# Patient Record
Sex: FEMALE | Race: AMERICAN INDIAN OR ALASKA NATIVE
[De-identification: names, ages, dates, MRNs, and addresses within clinical notes are randomized per-mention and may not be internally consistent; named-entity substitution may affect disease eponyms.]

---

## 2019-01-08 ENCOUNTER — HOSPITAL ENCOUNTER (EMERGENCY)
Dept: HOSPITAL 5 - ED | Age: 34
Discharge: HOME | End: 2019-01-08
Payer: SELF-PAY

## 2019-01-08 VITALS — SYSTOLIC BLOOD PRESSURE: 108 MMHG | DIASTOLIC BLOOD PRESSURE: 68 MMHG

## 2019-01-08 DIAGNOSIS — F17.200: ICD-10-CM

## 2019-01-08 DIAGNOSIS — R11.2: Primary | ICD-10-CM

## 2019-01-08 DIAGNOSIS — F41.9: ICD-10-CM

## 2019-01-08 LAB
ALBUMIN SERPL-MCNC: 4.1 G/DL (ref 3.9–5)
ALT SERPL-CCNC: 14 UNITS/L (ref 7–56)
BASOPHILS # (AUTO): 0 K/MM3 (ref 0–0.1)
BASOPHILS NFR BLD AUTO: 0.3 % (ref 0–1.8)
BILIRUB UR QL STRIP: (no result)
BLOOD UR QL VISUAL: (no result)
BUN SERPL-MCNC: 12 MG/DL (ref 7–17)
BUN/CREAT SERPL: 24 %
CALCIUM SERPL-MCNC: 8.6 MG/DL (ref 8.4–10.2)
EOSINOPHIL # BLD AUTO: 0.1 K/MM3 (ref 0–0.4)
EOSINOPHIL NFR BLD AUTO: 1.9 % (ref 0–4.3)
HCT VFR BLD CALC: 37 % (ref 30.3–42.9)
HEMOLYSIS INDEX: 42
HGB BLD-MCNC: 12.3 GM/DL (ref 10.1–14.3)
LYMPHOCYTES # BLD AUTO: 0.8 K/MM3 (ref 1.2–5.4)
LYMPHOCYTES NFR BLD AUTO: 12.2 % (ref 13.4–35)
MCHC RBC AUTO-ENTMCNC: 33 % (ref 30–34)
MCV RBC AUTO: 97 FL (ref 79–97)
MONOCYTES # (AUTO): 0.3 K/MM3 (ref 0–0.8)
MONOCYTES % (AUTO): 5.2 % (ref 0–7.3)
MUCOUS THREADS #/AREA URNS HPF: (no result) /HPF
PH UR STRIP: 8 [PH] (ref 5–7)
PLATELET # BLD: 249 K/MM3 (ref 140–440)
RBC # BLD AUTO: 3.8 M/MM3 (ref 3.65–5.03)
RBC #/AREA URNS HPF: 4 /HPF (ref 0–6)
UROBILINOGEN UR-MCNC: < 2 MG/DL (ref ?–2)
WBC #/AREA URNS HPF: < 1 /HPF (ref 0–6)

## 2019-01-08 PROCEDURE — 85025 COMPLETE CBC W/AUTO DIFF WBC: CPT

## 2019-01-08 PROCEDURE — 36415 COLL VENOUS BLD VENIPUNCTURE: CPT

## 2019-01-08 PROCEDURE — 96374 THER/PROPH/DIAG INJ IV PUSH: CPT

## 2019-01-08 PROCEDURE — 96361 HYDRATE IV INFUSION ADD-ON: CPT

## 2019-01-08 PROCEDURE — 81025 URINE PREGNANCY TEST: CPT

## 2019-01-08 PROCEDURE — 83690 ASSAY OF LIPASE: CPT

## 2019-01-08 PROCEDURE — 96375 TX/PRO/DX INJ NEW DRUG ADDON: CPT

## 2019-01-08 PROCEDURE — 99284 EMERGENCY DEPT VISIT MOD MDM: CPT

## 2019-01-08 PROCEDURE — 81001 URINALYSIS AUTO W/SCOPE: CPT

## 2019-01-08 PROCEDURE — 80053 COMPREHEN METABOLIC PANEL: CPT

## 2019-01-08 PROCEDURE — 74022 RADEX COMPL AQT ABD SERIES: CPT

## 2019-01-08 NOTE — EMERGENCY DEPARTMENT REPORT
ED General Adult HPI





- General


Chief complaint: Nausea/Vomiting/Diarrhea


Stated complaint: VOMIT


Time Seen by Provider: 01/08/19 10:09


Source: patient


Mode of arrival: Ambulatory


Limitations: No Limitations





- History of Present Illness


Initial comments: 





Patient presents to emergency department with a chief complaint of nausea and 

vomiting with abdominal cramping.  Patient states the symptoms started this 

morning.  Patient denies any sick contacts or diarrhea.  Patient has chest pain,

shortness of breath, or headache.  Patient works at [employer] as a manager and 

has multiple contacts will potentially sick people.


-: Sudden


Location: abdomen


Radiation: non-radiation


Severity scale (0 -10): 2


Quality: burning


Consistency: constant


Improves with: none


Worsens with: none


Associated Symptoms: nausea/vomiting, other (denies diarrhea)


Treatments Prior to Arrival: none





- Related Data


                                  Previous Rx's











 Medication  Instructions  Recorded  Last Taken  Type


 


Ibuprofen [Motrin] 600 mg PO Q8H PRN #12 tablet 12/08/18 Unknown Rx


 


predniSONE [Deltasone] 50 mg PO QDAY 5 Days #5 tab 12/08/18 Unknown Rx


 


Ondansetron [Zofran Odt] 4 mg PO Q8HR PRN #12 tab.rapdis 01/08/19 Unknown Rx


 


Promethazine [Phenergan TAB] 25 mg PO Q6HR PRN #20 tab 01/08/19 Unknown Rx


 


traMADol [Ultram] 50 mg PO Q6HR PRN #20 tablet 01/08/19 Unknown Rx











                                    Allergies











Allergy/AdvReac Type Severity Reaction Status Date / Time


 


No Known Allergies Allergy   Verified 12/08/18 18:05














ED Review of Systems


ROS: 


Stated complaint: VOMIT


Other details as noted in HPI





Comment: All other systems reviewed and negative


Constitutional: denies: chills, fever


Eyes: denies: eye pain, eye discharge, vision change


ENT: denies: ear pain, throat pain


Respiratory: denies: cough, shortness of breath, wheezing


Cardiovascular: denies: chest pain, palpitations


Endocrine: no symptoms reported


Gastrointestinal: abdominal pain (cramping), nausea, vomiting.  denies: diarrhea


Genitourinary: denies: urgency, dysuria, discharge


Musculoskeletal: denies: back pain, joint swelling, arthralgia


Skin: denies: rash, lesions


Neurological: denies: headache, weakness, paresthesias


Psychiatric: denies: anxiety, depression


Hematological/Lymphatic: denies: easy bleeding, easy bruising





ED Past Medical Hx





- Past Medical History


Previous Medical History?: Yes


Hx Psychiatric Treatment: Yes (anxiety)


Additional medical history: pre cardiac disease





- Surgical History


Past Surgical History?: No





- Social History


Smoking Status: Current Every Day Smoker


Substance Use Type: None





- Medications


Home Medications: 


                                Home Medications











 Medication  Instructions  Recorded  Confirmed  Last Taken  Type


 


Ibuprofen [Motrin] 600 mg PO Q8H PRN #12 tablet 12/08/18  Unknown Rx


 


predniSONE [Deltasone] 50 mg PO QDAY 5 Days #5 tab 12/08/18  Unknown Rx


 


Ondansetron [Zofran Odt] 4 mg PO Q8HR PRN #12 tab.rapdis 01/08/19  Unknown Rx


 


Promethazine [Phenergan TAB] 25 mg PO Q6HR PRN #20 tab 01/08/19  Unknown Rx


 


traMADol [Ultram] 50 mg PO Q6HR PRN #20 tablet 01/08/19  Unknown Rx














ED Physical Exam





- General


Limitations: No Limitations


General appearance: alert, in no apparent distress





- Head


Head exam: Present: atraumatic, normocephalic





- Eye


Eye exam: Present: normal appearance, PERRL, EOMI





- ENT


ENT exam: Present: mucous membranes dry





- Neck


Neck exam: Present: normal inspection





- Respiratory


Respiratory exam: Present: normal lung sounds bilaterally.  Absent: respiratory 

distress, wheezes, rales





- Cardiovascular


Cardiovascular Exam: Present: normal rhythm, tachycardia.  Absent: systolic 

murmur, diastolic murmur, rubs, gallop





- GI/Abdominal


GI/Abdominal exam: Present: soft, normal bowel sounds.  Absent: distended, 

tenderness





- Extremities Exam


Extremities exam: Present: normal inspection





- Back Exam


Back exam: Present: normal inspection





- Neurological Exam


Neurological exam: Present: alert, oriented X3, CN II-XII intact.  Absent: motor

 sensory deficit





- Psychiatric


Psychiatric exam: Present: normal affect, normal mood





- Skin


Skin exam: Present: warm, dry, intact, normal color.  Absent: rash





ED Course


                                   Vital Signs











  01/08/19





  09:11


 


Temperature 98.6 F


 


Pulse Rate 77


 


Respiratory 16





Rate 


 


Blood Pressure 108/68


 


O2 Sat by Pulse 99





Oximetry 














ED Medical Decision Making





- Lab Data


Result diagrams: 


                                 01/08/19 10:39





                                 01/08/19 10:39








                                   Lab Results











  01/08/19 01/08/19 01/08/19 Range/Units





  10:39 10:39 Unknown 


 


WBC  6.4    (4.5-11.0)  K/mm3


 


RBC  3.80    (3.65-5.03)  M/mm3


 


Hgb  12.3    (10.1-14.3)  gm/dl


 


Hct  37.0    (30.3-42.9)  %


 


MCV  97    (79-97)  fl


 


MCH  32    (28-32)  pg


 


MCHC  33    (30-34)  %


 


RDW  13.3    (13.2-15.2)  %


 


Plt Count  249    (140-440)  K/mm3


 


Lymph % (Auto)  12.2 L    (13.4-35.0)  %


 


Mono % (Auto)  5.2    (0.0-7.3)  %


 


Eos % (Auto)  1.9    (0.0-4.3)  %


 


Baso % (Auto)  0.3    (0.0-1.8)  %


 


Lymph #  0.8 L    (1.2-5.4)  K/mm3


 


Mono #  0.3    (0.0-0.8)  K/mm3


 


Eos #  0.1    (0.0-0.4)  K/mm3


 


Baso #  0.0    (0.0-0.1)  K/mm3


 


Seg Neutrophils %  80.4 H    (40.0-70.0)  %


 


Seg Neutrophils #  5.2    (1.8-7.7)  K/mm3


 


Sodium   138   (137-145)  mmol/L


 


Potassium   4.0   (3.6-5.0)  mmol/L


 


Chloride   102.0   ()  mmol/L


 


Carbon Dioxide   24   (22-30)  mmol/L


 


Anion Gap   16   mmol/L


 


BUN   12   (7-17)  mg/dL


 


Creatinine   0.5 L   (0.7-1.2)  mg/dL


 


Estimated GFR   > 60   ml/min


 


BUN/Creatinine Ratio   24   %


 


Glucose   93   ()  mg/dL


 


Calcium   8.6   (8.4-10.2)  mg/dL


 


Total Bilirubin   0.30   (0.1-1.2)  mg/dL


 


AST   16   (5-40)  units/L


 


ALT   14   (7-56)  units/L


 


Alkaline Phosphatase   58   ()  units/L


 


Total Protein   6.1 L   (6.3-8.2)  g/dL


 


Albumin   4.1   (3.9-5)  g/dL


 


Albumin/Globulin Ratio   2.1   %


 


Lipase   31   (13-60)  units/L


 


Urine Color    Yellow  (Yellow)  


 


Urine Turbidity    Slightly-cloudy  (Clear)  


 


Urine pH    8.0 H  (5.0-7.0)  


 


Ur Specific Gravity    1.020  (1.003-1.030)  


 


Urine Protein    30 mg/dl  (Negative)  mg/dL


 


Urine Glucose (UA)    Neg  (Negative)  mg/dL


 


Urine Ketones    Neg  (Negative)  mg/dL


 


Urine Blood    Neg  (Negative)  


 


Urine Nitrite    Neg  (Negative)  


 


Urine Bilirubin    Neg  (Negative)  


 


Urine Urobilinogen    < 2.0  (<2.0)  mg/dL


 


Ur Leukocyte Esterase    Neg  (Negative)  


 


Urine WBC (Auto)    < 1.0  (0.0-6.0)  /HPF


 


Urine RBC (Auto)    4.0  (0.0-6.0)  /HPF


 


U Epithel Cells (Auto)    5.0  (0-13.0)  /HPF


 


Urine Mucus    3+  /HPF


 


Urine HCG, Qual    Negative  (Negative)  














- Medical Decision Making





Discussed results with the patient


Improved with meds and IV fluids


Critical care attestation.: 


If time is entered above; I have spent that time in minutes in the direct care 

of this critically ill patient, excluding procedure time.








ED Disposition


Clinical Impression: 


 Nausea & vomiting





Disposition: DC-01 TO HOME OR SELFCARE


Is pt being admited?: No


Does the pt Need Aspirin: No


Condition: Stable


Instructions:  Acute Nausea and Vomiting (ED)


Additional Instructions: 


Return if worse


Prescriptions: 


Ondansetron [Zofran Odt] 4 mg PO Q8HR PRN #12 tab.rapdis


 PRN Reason: Nausea


Promethazine [Phenergan TAB] 25 mg PO Q6HR PRN #20 tab


 PRN Reason: Nausea


traMADol [Ultram] 50 mg PO Q6HR PRN #20 tablet


 PRN Reason: Pain


Referrals: 


[facility] [Primary Care Provider] - 3-5 Days


[facility] [Outside] - 3-5 Days


Forms:  Work/School Release Form(ED)


Time of Disposition: 12:24

## 2019-01-08 NOTE — XRAY REPORT
ABDOMINAL SERIES:



History: Pain.



Erect chest film shows no acute or significant changes involving the 

heart or lung fields.  There is no evidence of free air beneath the 

diaphragms.  The gas pattern within the abdomen is unremarkable. There 

is no evidence of bowel dilatation, significant air-fluid levels, or 

masses.  Organ shadows are unremarkable.



IMPRESSION:

Abdominal series within normal limits.

## 2019-07-30 ENCOUNTER — HOSPITAL ENCOUNTER (EMERGENCY)
Dept: HOSPITAL 5 - ED | Age: 34
Discharge: HOME | End: 2019-07-30
Payer: SELF-PAY

## 2019-07-30 VITALS — DIASTOLIC BLOOD PRESSURE: 78 MMHG | SYSTOLIC BLOOD PRESSURE: 117 MMHG

## 2019-07-30 DIAGNOSIS — F17.200: ICD-10-CM

## 2019-07-30 DIAGNOSIS — B96.89: ICD-10-CM

## 2019-07-30 DIAGNOSIS — Z79.899: ICD-10-CM

## 2019-07-30 DIAGNOSIS — N76.0: Primary | ICD-10-CM

## 2019-07-30 DIAGNOSIS — F41.9: ICD-10-CM

## 2019-07-30 DIAGNOSIS — F12.10: ICD-10-CM

## 2019-07-30 PROCEDURE — 87591 N.GONORRHOEAE DNA AMP PROB: CPT

## 2019-07-30 PROCEDURE — 87210 SMEAR WET MOUNT SALINE/INK: CPT

## 2019-07-30 NOTE — EMERGENCY DEPARTMENT REPORT
ED Female  HPI





- General


Chief complaint: Urogenital-Female


Stated complaint: DISCHARGE


Time Seen by Provider: 19 11:23


Source: patient


Mode of arrival: Ambulatory


Limitations: No Limitations





- History of Present Illness


Initial comments: 





This is a 33-year-old  female who presents to emergency room 

with vaginal discharge for 2 days.  Patient states she recently completed some 

antibiotics for her to think she may possibly have a yeast infection.  LMP 

07/15/19, A0.  She denies pelvic pain, urinary frequency, urgency, dysuria, 

or vaginal bleeding.


MD Complaint: vaginal discharge


Onset/Timin


-: days(s)


Location: labia


Radiation: non-radiating


Severity: mild


Severity scale (0 -10): 3


Improves with: none


Worsens with: urination


Are you Pregnant Now?: No


Last Menstrual Period: 07/15/19


EDC: 20


Associated Symptoms: denies other symptoms





- Related Data


Sexually active: Yes (Women only)


: 1


Para: 1


A: 0


                                  Previous Rx's











 Medication  Instructions  Recorded  Last Taken  Type


 


Ibuprofen [Motrin] 600 mg PO Q8H PRN #12 tablet 18 Unknown Rx


 


predniSONE [Deltasone] 50 mg PO QDAY 5 Days #5 tab 18 Unknown Rx


 


Ondansetron [Zofran Odt] 4 mg PO Q8HR PRN #12 tab.rapdis 19 Unknown Rx


 


Promethazine [Phenergan TAB] 25 mg PO Q6HR PRN #20 tab 19 Unknown Rx


 


traMADol [Ultram] 50 mg PO Q6HR PRN #20 tablet 19 Unknown Rx


 


Fluconazole [Diflucan TAB] 150 mg PO ONCE #1 tablet 19 Unknown Rx


 


metroNIDAZOLE [Flagyl TAB] 500 mg PO Q12HR #14 tab 19 Unknown Rx











                                    Allergies











Allergy/AdvReac Type Severity Reaction Status Date / Time


 


No Known Allergies Allergy   Verified 18 18:05














ED Review of Systems


ROS: 


Stated complaint: DISCHARGE


Other details as noted in HPI





Constitutional: denies: chills, fever


Respiratory: denies: cough, shortness of breath, wheezing


Cardiovascular: denies: chest pain, palpitations


Gastrointestinal: denies: abdominal pain, nausea, diarrhea


Genitourinary: discharge.  denies: urgency, dysuria


Musculoskeletal: denies: back pain, joint swelling, arthralgia


Skin: denies: rash, lesions


Neurological: denies: headache, weakness, paresthesias


Psychiatric: denies: anxiety, depression





ED Past Medical Hx





- Past Medical History


Previous Medical History?: Yes


Hx Psychiatric Treatment: Yes (anxiety)


Additional medical history: pre cardiac disease





- Surgical History


Past Surgical History?: No





- Social History


Smoking Status: Heavy Tobacco Smoker


Substance Use Type: Marijuana





- Medications


Home Medications: 


                                Home Medications











 Medication  Instructions  Recorded  Confirmed  Last Taken  Type


 


Ibuprofen [Motrin] 600 mg PO Q8H PRN #12 tablet 18  Unknown Rx


 


predniSONE [Deltasone] 50 mg PO QDAY 5 Days #5 tab 18  Unknown Rx


 


Ondansetron [Zofran Odt] 4 mg PO Q8HR PRN #12 tab.rapdis 19  Unknown Rx


 


Promethazine [Phenergan TAB] 25 mg PO Q6HR PRN #20 tab 19  Unknown Rx


 


traMADol [Ultram] 50 mg PO Q6HR PRN #20 tablet 19  Unknown Rx


 


Fluconazole [Diflucan TAB] 150 mg PO ONCE #1 tablet 19  Unknown Rx


 


metroNIDAZOLE [Flagyl TAB] 500 mg PO Q12HR #14 tab 19  Unknown Rx














ED Physical Exam





- General


Limitations: No Limitations


General appearance: alert, in no apparent distress





- Respiratory


Respiratory exam: Present: normal lung sounds bilaterally.  Absent: respiratory 

distress





- Cardiovascular


Cardiovascular Exam: Present: regular rate, normal rhythm.  Absent: systolic 

murmur, diastolic murmur, rubs, gallop





- GI/Abdominal


GI/Abdominal exam: Present: soft, normal bowel sounds.  Absent: distended, 

tenderness, guarding, rebound, rigid, organomegaly





- 


External exam: Present: normal external exam


Speculum exam: Present: vaginal discharge (malodorous curdy white).  Absent: 

cervical discharge, vaginal bleeding, foreign body, tissue, laceration


Bi-manual exam: Present: normal bi-manual exam.  Absent: cervical motion 

tendernes, adnexal tenderness, adnexal mass, uterine enlargement





- Back Exam


Back exam: Absent: CVA tenderness (R), CVA tenderness (L)





- Neurological Exam


Neurological exam: Present: alert, oriented X3





- Psychiatric


Psychiatric exam: Present: normal affect, normal mood





- Skin


Skin exam: Present: warm, dry, intact, normal color.  Absent: rash





ED Course


                                   Vital Signs











  19





  10:53


 


Temperature 98.4 F


 


Pulse Rate 69


 


Respiratory 16





Rate 


 


Blood Pressure 117/78


 


O2 Sat by Pulse 99





Oximetry 














ED Medical Decision Making





- Medical Decision Making





This is a 33-year-old -American female who presents with vaginal 

discharge after completing antibiotics. Patient was examined by me.  Vitals are 

stable and in no acute distress.  A wet prep and gonorrhea and Chlamydia was 

obtained via pelvic exam.  Gonorrhea and chlamydia is pending.  Wet prep 

positive for clue cells and yeast, negative trichomoniasis.  Start metronidazole

and Diflucan for acute vaginitis.  Discharged home in stable condition.  

Discussed prevention options. F/U with PCP or Health Department.


Critical care attestation.: 


If time is entered above; I have spent that time in minutes in the direct care 

of this critically ill patient, excluding procedure time.








ED Disposition


Clinical Impression: 


 Vaginal yeast infection, Bacterial vaginitis, Vaginal discharge, Acute 

vaginitis





Disposition:  TO HOME OR SELFCARE


Is pt being admited?: No


Does the pt Need Aspirin: No


Condition: Stable


Instructions:  Bacterial Vaginosis (ED), Vaginitis (ED), Vulvovaginal 

Candidiasis (ED)


Additional Instructions: 


Please course of antibiotics as prescribed.  Avoid drinking alcohol while taking

antibiotics for 24 hours if completion.  Follow up with a gynecologist or the 

health department for full STD screen him. 


Prescriptions: 


Fluconazole [Diflucan TAB] 150 mg PO ONCE #1 tablet


metroNIDAZOLE [Flagyl TAB] 500 mg PO Q12HR #14 tab


Referrals: 


[facility] [Outside] - 3-5 Days


[facility] [Outside] - 3-5 Days


[facility] [Provider Group] - 3-5 Days


[facility] [Provider Group] - 3-5 Days


[facility] [Outside] - 3-5 Days


Forms:  STI Treatment and Prevention


Time of Disposition: 13:19

## 2019-09-12 ENCOUNTER — HOSPITAL ENCOUNTER (EMERGENCY)
Dept: HOSPITAL 5 - ED | Age: 34
Discharge: HOME | End: 2019-09-12
Payer: SELF-PAY

## 2019-09-12 VITALS — SYSTOLIC BLOOD PRESSURE: 112 MMHG | DIASTOLIC BLOOD PRESSURE: 71 MMHG

## 2019-09-12 DIAGNOSIS — Z79.899: ICD-10-CM

## 2019-09-12 DIAGNOSIS — H91.92: ICD-10-CM

## 2019-09-12 DIAGNOSIS — H92.02: Primary | ICD-10-CM

## 2019-09-12 DIAGNOSIS — F41.9: ICD-10-CM

## 2019-09-12 NOTE — EMERGENCY DEPARTMENT REPORT
ED ENT HPI





- General


Chief complaint: Earache


Stated complaint: LFT EAR CANT HEAR


Time Seen by Provider: 09/12/19 12:27


Source: patient


Mode of arrival: Ambulatory


Limitations: No Limitations





- History of Present Illness


Initial comments: 





34-year-old female presents to the hospital complains of a fullness to her left 

ear intermittent decreased hearing for several months.  Patient overslept and 

was late for work so decided to come to the ER to get it checked out.  She feels

like is full of wax.  She denies fever, drainage, or pain.





- Related Data


                                  Previous Rx's











 Medication  Instructions  Recorded  Last Taken  Type


 


Ibuprofen [Motrin] 600 mg PO Q8H PRN #12 tablet 12/08/18 Unknown Rx


 


predniSONE [Deltasone] 50 mg PO QDAY 5 Days #5 tab 12/08/18 Unknown Rx


 


Ondansetron [Zofran Odt] 4 mg PO Q8HR PRN #12 tab.rapdis 01/08/19 Unknown Rx


 


Promethazine [Phenergan TAB] 25 mg PO Q6HR PRN #20 tab 01/08/19 Unknown Rx


 


traMADol [Ultram] 50 mg PO Q6HR PRN #20 tablet 01/08/19 Unknown Rx


 


Fluconazole [Diflucan TAB] 150 mg PO ONCE #1 tablet 07/30/19 Unknown Rx


 


metroNIDAZOLE [Flagyl TAB] 500 mg PO Q12HR #14 tab 07/30/19 Unknown Rx











                                    Allergies











Allergy/AdvReac Type Severity Reaction Status Date / Time


 


No Known Allergies Allergy   Verified 12/08/18 18:05














ED Dental HPI





- General


Chief complaint: Earache


Stated complaint: LFT EAR CANT HEAR


Time Seen by Provider: 09/12/19 12:27


Source: patient


Mode of arrival: Ambulatory


Limitations: No Limitations





- Related Data


                                  Previous Rx's











 Medication  Instructions  Recorded  Last Taken  Type


 


Ibuprofen [Motrin] 600 mg PO Q8H PRN #12 tablet 12/08/18 Unknown Rx


 


predniSONE [Deltasone] 50 mg PO QDAY 5 Days #5 tab 12/08/18 Unknown Rx


 


Ondansetron [Zofran Odt] 4 mg PO Q8HR PRN #12 tab.rapdis 01/08/19 Unknown Rx


 


Promethazine [Phenergan TAB] 25 mg PO Q6HR PRN #20 tab 01/08/19 Unknown Rx


 


traMADol [Ultram] 50 mg PO Q6HR PRN #20 tablet 01/08/19 Unknown Rx


 


Fluconazole [Diflucan TAB] 150 mg PO ONCE #1 tablet 07/30/19 Unknown Rx


 


metroNIDAZOLE [Flagyl TAB] 500 mg PO Q12HR #14 tab 07/30/19 Unknown Rx











                                    Allergies











Allergy/AdvReac Type Severity Reaction Status Date / Time


 


No Known Allergies Allergy   Verified 12/08/18 18:05














ED Review of Systems


ROS: 


Stated complaint: LFT EAR CANT HEAR


Other details as noted in HPI





Comment: All other systems reviewed and negative





ED Past Medical Hx





- Past Medical History


Hx Psychiatric Treatment: Yes (anxiety)


Additional medical history: pre cardiac disease





- Social History


Smoking Status: Never Smoker


Substance Use Type: None





- Medications


Home Medications: 


                                Home Medications











 Medication  Instructions  Recorded  Confirmed  Last Taken  Type


 


Ibuprofen [Motrin] 600 mg PO Q8H PRN #12 tablet 12/08/18  Unknown Rx


 


predniSONE [Deltasone] 50 mg PO QDAY 5 Days #5 tab 12/08/18  Unknown Rx


 


Ondansetron [Zofran Odt] 4 mg PO Q8HR PRN #12 tab.rapdis 01/08/19  Unknown Rx


 


Promethazine [Phenergan TAB] 25 mg PO Q6HR PRN #20 tab 01/08/19  Unknown Rx


 


traMADol [Ultram] 50 mg PO Q6HR PRN #20 tablet 01/08/19  Unknown Rx


 


Fluconazole [Diflucan TAB] 150 mg PO ONCE #1 tablet 07/30/19  Unknown Rx


 


metroNIDAZOLE [Flagyl TAB] 500 mg PO Q12HR #14 tab 07/30/19  Unknown Rx














ED Physical Exam





- General


Limitations: No Limitations





- Other


Other exam information: 





Gen.: No acute distress


Head: Atraumatic


Eyes: Normal appearance


ENT: Moist mucous membranes, bilateral ear canals without erythema or cerumen 

impaction.  No tenderness with tract distraction.  Bilateral TMs normal with 

good light reflex, no redness, no perforation, and no signs of fluid.  Patient's

hearing and grossly intact.


Neck: Normal appearance, no posterior midline tenderness, no meningismus


Chest: Clear to auscultation bilaterally


Cardiovascular: Regular rate and rhythm


Abdomen: Normal appearance, soft, nontender, no rebound or guarding, normal 

bowel sounds


Back: Normal appearance, nontender


Extremity: Full range of motion, normal appearance


Neuro: Alert, clear speech, no focal motor or sensory deficit


Psychiatric: Appropriate


Skin: No rash





ED Course





                                   Vital Signs











  09/12/19





  11:53


 


Temperature 98.3 F


 


Pulse Rate 92 H


 


Respiratory 16





Rate 


 


Blood Pressure 113/76





[Left] 


 


O2 Sat by Pulse 99





Oximetry 














ED Medical Decision Making





- Medical Decision Making





Patient does not have any gross abnormality on examination.  Reports 

intermittent symptoms times months.  Hearing grossly intact.  Outpatient follow-

up with ENT advised





- Differential Diagnosis


perforated TM, fluid in the ear, otitis media, otitis externa


Critical Care Time: No


Critical care attestation.: 


If time is entered above; I have spent that time in minutes in the direct care 

of this critically ill patient, excluding procedure time.








ED Disposition


Clinical Impression: 


Ear fullness


Qualifiers:


 Laterality: left Qualified Code(s): H93.8X2 - Other specified disorders of left

 ear





Disposition: DC-01 TO HOME OR SELFCARE


Is pt being admited?: No


Does the pt Need Aspirin: No


Condition: Stable


Instructions:  Earache (ED)


Additional Instructions: 


 Follow-up with your doctor or with the doctor/clinic provided.  Return if 

symptoms worsen as indicated by your discharge instructions.


Referrals: 


ERNESTINE PARK MD [Staff Physician] - 3-5 Days


[facility] [Provider Group] - 3-5 Days


Forms:  Work/School Release Form(ED)


Time of Disposition: 12:38